# Patient Record
(demographics unavailable — no encounter records)

---

## 2025-05-28 NOTE — END OF VISIT
[id number]
[Time Spent: ___ minutes] : I have spent [unfilled] minutes of time on the encounter which excludes teaching and separately reported services.

## 2025-06-02 NOTE — FAMILY HISTORY
[TextEntry] : Family history of breast cancer in mother at 57 Family history of breast cancer in maternal cousin at 83 Family history of possible breast cancer in maternal grandmother Denies family history of ovarian cancer

## 2025-06-02 NOTE — PLAN
[TextEntry] : -genetic testing -PCP clearance -L ultrasound-guided lumpectomy and sentinel node biopsy -MRI due to extreme density of mammogram

## 2025-06-02 NOTE — PHYSICAL EXAM
[Supple] : supple [No Supraclavicular Adenopathy] : no supraclavicular adenopathy [Examined in the supine and seated position] : examined in the supine and seated position [No dominant masses] : no dominant masses in right breast  [No Nipple Retraction] : no left nipple retraction [No Nipple Discharge] : no left nipple discharge [No Axillary Lymphadenopathy] : no left axillary lymphadenopathy [de-identified] :  left breast mass at 11:00 8cmFN.  It is high up at the superior rim of the breast. It is slightly tethered but mobile.

## 2025-06-02 NOTE — PHYSICAL EXAM
[Supple] : supple [No Supraclavicular Adenopathy] : no supraclavicular adenopathy [Examined in the supine and seated position] : examined in the supine and seated position [No dominant masses] : no dominant masses in right breast  [No Nipple Retraction] : no left nipple retraction [No Nipple Discharge] : no left nipple discharge [No Axillary Lymphadenopathy] : no left axillary lymphadenopathy [de-identified] :  left breast mass at 11:00 8cmFN.  It is high up at the superior rim of the breast. It is slightly tethered but mobile.

## 2025-06-02 NOTE — ASSESSMENT
[FreeTextEntry1] : 83 year old female presents for initial evaluation regarding LEFT IDC, ER/HI+ HER-2 equivocal, FISH pending, initially palpated by the patient and seen on B/l dxMMG/US 5/15/25 as a lobulated palpable mass L 11:00 8cmfn measuring 2.7cm, BI-RADS 5, with rec for US  bx -- completed 5/20/25; path yielded well differentiated IDC measuring at least 10mm a/w calcs.  Reviewed and discussed pathology and imaging results. The use of multi-modal therapy for the treatment of breast cancer was discussed. Reviewed with patient that her tumor is ER/HI+, however pending FISH. Briefly reviewed the efficacy of anti-estrogen medication in the treatment of ER+ breast tumors. Discussed FISH/HER-2 testing and its importance in treatment planning. Advised patient if HER-2 +, she is likely to require chemotherapy. Discussed adjuvant vs neoadjuvant chemotherapy with Herceptin. Advised patient if she is HER-2 negative then she will only require anti-hormonal medication. All patients questions were answered to their satisfaction.   Surgical options were reviewed including a lumpectomy to negative margins, with whole breast radiation therapy versus a mastectomy with or without reconstruction. Patient made aware that she is amendable to lumpectomy and mastectomy is not recommended. Also reviewed with patient that the deep margin may be difficult to obtain and therefore may have to take a small amount of muscle. Discussed SLNBx procedure and advised patient that, given her age, this is not required at the time of lumpectomy. However, given the tumor size (2.7cm) and unfavorable location in conjunction with patients anxiety regarding her mothers experience with stage 4 breast cancer, decided with patient to proceed with SLNBx. Touched upon general indications for the use of adjuvant hormonal and chemotherapy and targeted therapies. It was explained that medical treatments are ultimately dependent on the final surgical pathology results. Discussed use of risk assessment assay Oncotype on surgical specimen. Briefly reviewed the possibility of radiation therapy given the location of her tumor. However, she was made aware that typically radiation is not necessary in a patient her age with ER+ tumor. Patient advised that proper referrals (med onc and rad onc) will be provided when needed.  After further review of her films, her breast tissue is extremely dense and would benefit from MRI.  Will go ahead and order it.  Discussed patient's diagnosis in detail and answered any questions. Plan for L localized lumpectomy + L SLNBx, pending PCP clearance. Surgical consent obtained today, surgical coordinator aware. Baseline sozo measurement obtained. Discussed importance and implication of genetic testing in regards to her family history and offspring. Patient would like to go forward with genetic testing -- bloodwork drawn in-office today for [facility]. All patients questions were answered to their satisfaction. Patient verbalizes understanding and agreement with the plan.

## 2025-06-02 NOTE — DATA REVIEWED
[FreeTextEntry1] : 5/15/25 ([facility]) B/l dxMMG/US: extremely dense. Lobulated palpable mass L 11:00 close to pectoral muscle. Measures 2.7cm and has findings c/w carcinoma of the left breast. BI-RADS 5. F/u: US bx   5/20/25 ([facility]) L 11:00 8cmfn mass US bx (Q clip): path yielded IDC, tumor markers pending, well differentiated, measuring at least 10mm, a/w calcs. Concordant and malignant. F/u: surgical or oncologic management

## 2025-06-02 NOTE — CONSULT LETTER
[Dear  ___] : Dear ~DREA, [Consult Letter:] : I had the pleasure of evaluating your patient, [unfilled]. [Please see my note below.] : Please see my note below. [Consult Closing:] : Thank you very much for allowing me to participate in the care of this patient.  If you have any questions, please do not hesitate to contact me. [Sincerely,] : Sincerely, [FreeTextEntry2] : Dr. Allison Stephens [FreeTextEntry3] : Nimesh Boogie MD Chief of Breast Surgery Director of Breast Cancer Program [facility]

## 2025-06-02 NOTE — CONSULT LETTER
[Dear  ___] : Dear ~DREA, [Consult Letter:] : I had the pleasure of evaluating your patient, [unfilled]. [Please see my note below.] : Please see my note below. [Consult Closing:] : Thank you very much for allowing me to participate in the care of this patient.  If you have any questions, please do not hesitate to contact me. [Sincerely,] : Sincerely, [FreeTextEntry2] : Dr. Allison Stephens [FreeTextEntry3] : Nimesh Boogie MD Chief of Breast Surgery Director of Breast Cancer Program [facility]

## 2025-06-02 NOTE — PHYSICAL EXAM
[Supple] : supple [No Supraclavicular Adenopathy] : no supraclavicular adenopathy [Examined in the supine and seated position] : examined in the supine and seated position [No dominant masses] : no dominant masses in right breast  [No Nipple Retraction] : no left nipple retraction [No Nipple Discharge] : no left nipple discharge [No Axillary Lymphadenopathy] : no left axillary lymphadenopathy [de-identified] :  left breast mass at 11:00 8cmFN.  It is high up at the superior rim of the breast. It is slightly tethered but mobile.

## 2025-06-02 NOTE — HISTORY OF PRESENT ILLNESS
[FreeTextEntry1] : 83 year old female with Ashkenazi Sikhism ancestry was self-referred who presents accompanied by her daughter-in-law for initial evaluation regarding LEFT IDC, ER/ND+, HER-2 equivocal, FISH pending, initially palpated by the patient ~2 months ago and seen on B/l dxMMG/US 5/15/25 as a lobulated palpable mass L 11:00 8cmfn measuring 2.7cm, BI-RADS 5, with rec for US  bx -- completed 5/20/25; path yielded well differentiated IDC measuring at least 10mm a/w calcs. Patient reports left breast pain but otherwise denies nipple discharge or skin changes. Denies prior breast surgeries.  Patient reports family history of breast cancer in her mother at age 57, DOD at 66 y/o (metastatic to brain) and in her MCousin at age 70s s/p lumpx (MCousin negative for BRCA mutation). Denies famhx of ovarian, pancreatic, or colon cancer. Patient has not had genetic testing performed.  Patient denies history of HTN managed by medications. She denies heart disease, DM, blood clots, sleep apnea, COPD, or issues with anesthesia. Patient denies any known drug allergies.

## 2025-06-02 NOTE — HISTORY OF PRESENT ILLNESS
[FreeTextEntry1] : 83 year old female with Ashkenazi Sikh ancestry was self-referred who presents accompanied by her daughter-in-law for initial evaluation regarding LEFT IDC, ER/IA+, HER-2 equivocal, FISH pending, initially palpated by the patient ~2 months ago and seen on B/l dxMMG/US 5/15/25 as a lobulated palpable mass L 11:00 8cmfn measuring 2.7cm, BI-RADS 5, with rec for US  bx -- completed 5/20/25; path yielded well differentiated IDC measuring at least 10mm a/w calcs. Patient reports left breast pain but otherwise denies nipple discharge or skin changes. Denies prior breast surgeries.  Patient reports family history of breast cancer in her mother at age 57, DOD at 66 y/o (metastatic to brain) and in her MCousin at age 70s s/p lumpx (MCousin negative for BRCA mutation). Denies famhx of ovarian, pancreatic, or colon cancer. Patient has not had genetic testing performed.  Patient denies history of HTN managed by medications. She denies heart disease, DM, blood clots, sleep apnea, COPD, or issues with anesthesia. Patient denies any known drug allergies.

## 2025-06-02 NOTE — PROCEDURE
[FreeTextEntry1] : left breast US [FreeTextEntry2] : newly diagnosed left breast cancer [FreeTextEntry3] : Technique: a targeted left breast ultrasound was performed with evaluation of the UOQ, retroareolar region, and axilla. US Findings: left breast mass at 11:00 8cmFN was detected, 1.3cm x 2.3cm transverse by 1.3cm x 2.6cm longitudinal in dimension.

## 2025-06-02 NOTE — PAST MEDICAL HISTORY
[Postmenopausal] : The patient is postmenopausal [Menarche Age ____] : age at menarche was [unfilled] [Menopause Age____] : age at menopause was [unfilled] [Total Preg ___] : G[unfilled] [Live Births ___] : P[unfilled]  [Age At Live Birth ___] : Age at live birth: [unfilled] [History of Hormone Replacement Treatment] : has no history of hormone replacement treatment [FreeTextEntry5] : Breast biopsy [FreeTextEntry6] : No [FreeTextEntry7] : Yes, IUD in past [FreeTextEntry8] : No

## 2025-06-02 NOTE — ASSESSMENT
[FreeTextEntry1] : 83 year old female presents for initial evaluation regarding LEFT IDC, ER/OR+ HER-2 equivocal, FISH pending, initially palpated by the patient and seen on B/l dxMMG/US 5/15/25 as a lobulated palpable mass L 11:00 8cmfn measuring 2.7cm, BI-RADS 5, with rec for US  bx -- completed 5/20/25; path yielded well differentiated IDC measuring at least 10mm a/w calcs.  Reviewed and discussed pathology and imaging results. The use of multi-modal therapy for the treatment of breast cancer was discussed. Reviewed with patient that her tumor is ER/OR+, however pending FISH. Briefly reviewed the efficacy of anti-estrogen medication in the treatment of ER+ breast tumors. Discussed FISH/HER-2 testing and its importance in treatment planning. Advised patient if HER-2 +, she is likely to require chemotherapy. Discussed adjuvant vs neoadjuvant chemotherapy with Herceptin. Advised patient if she is HER-2 negative then she will only require anti-hormonal medication. All patients questions were answered to their satisfaction.   Surgical options were reviewed including a lumpectomy to negative margins, with whole breast radiation therapy versus a mastectomy with or without reconstruction. Patient made aware that she is amendable to lumpectomy and mastectomy is not recommended. Also reviewed with patient that the deep margin may be difficult to obtain and therefore may have to take a small amount of muscle. Discussed SLNBx procedure and advised patient that, given her age, this is not required at the time of lumpectomy. However, given the tumor size (2.7cm) and unfavorable location in conjunction with patients anxiety regarding her mothers experience with stage 4 breast cancer, decided with patient to proceed with SLNBx. Touched upon general indications for the use of adjuvant hormonal and chemotherapy and targeted therapies. It was explained that medical treatments are ultimately dependent on the final surgical pathology results. Discussed use of risk assessment assay Oncotype on surgical specimen. Briefly reviewed the possibility of radiation therapy given the location of her tumor. However, she was made aware that typically radiation is not necessary in a patient her age with ER+ tumor. Patient advised that proper referrals (med onc and rad onc) will be provided when needed.  After further review of her films, her breast tissue is extremely dense and would benefit from MRI.  Will go ahead and order it.  Discussed patient's diagnosis in detail and answered any questions. Plan for L localized lumpectomy + L SLNBx, pending PCP clearance. Surgical consent obtained today, surgical coordinator aware. Baseline sozo measurement obtained. Discussed importance and implication of genetic testing in regards to her family history and offspring. Patient would like to go forward with genetic testing -- bloodwork drawn in-office today for [employer]. All patients questions were answered to their satisfaction. Patient verbalizes understanding and agreement with the plan.

## 2025-06-02 NOTE — HISTORY OF PRESENT ILLNESS
[FreeTextEntry1] : 83 year old female with Ashkenazi [place] ancestry was self-referred who presents accompanied by her daughter-in-law for initial evaluation regarding LEFT IDC, ER/OH+, HER-2 equivocal, FISH pending, initially palpated by the patient ~2 months ago and seen on B/l dxMMG/US 5/15/25 as a lobulated palpable mass L 11:00 8cmfn measuring 2.7cm, BI-RADS 5, with rec for US  bx -- completed 5/20/25; path yielded well differentiated IDC measuring at least 10mm a/w calcs. Patient reports left breast pain but otherwise denies nipple discharge or skin changes. Denies prior breast surgeries.  Patient reports family history of breast cancer in her mother at age 57, DOD at 68 y/o (metastatic to brain) and in her MCousin at age 70s s/p lumpx (MCousin negative for BRCA mutation). Denies famhx of ovarian, pancreatic, or colon cancer. Patient has not had genetic testing performed.  Patient denies history of HTN managed by medications. She denies heart disease, DM, blood clots, sleep apnea, COPD, or issues with anesthesia. Patient denies any known drug allergies.

## 2025-06-02 NOTE — ASSESSMENT
[FreeTextEntry1] : 83 year old female presents for initial evaluation regarding LEFT IDC, ER/NM+ HER-2 equivocal, FISH pending, initially palpated by the patient and seen on B/l dxMMG/US 5/15/25 as a lobulated palpable mass L 11:00 8cmfn measuring 2.7cm, BI-RADS 5, with rec for US  bx -- completed 5/20/25; path yielded well differentiated IDC measuring at least 10mm a/w calcs.  Reviewed and discussed pathology and imaging results. The use of multi-modal therapy for the treatment of breast cancer was discussed. Reviewed with patient that her tumor is ER/NM+, however pending FISH. Briefly reviewed the efficacy of anti-estrogen medication in the treatment of ER+ breast tumors. Discussed FISH/HER-2 testing and its importance in treatment planning. Advised patient if HER-2 +, she is likely to require chemotherapy. Discussed adjuvant vs neoadjuvant chemotherapy with Herceptin. Advised patient if she is HER-2 negative then she will only require anti-hormonal medication. All patients questions were answered to their satisfaction.   Surgical options were reviewed including a lumpectomy to negative margins, with whole breast radiation therapy versus a mastectomy with or without reconstruction. Patient made aware that she is amendable to lumpectomy and mastectomy is not recommended. Also reviewed with patient that the deep margin may be difficult to obtain and therefore may have to take a small amount of muscle. Discussed SLNBx procedure and advised patient that, given her age, this is not required at the time of lumpectomy. However, given the tumor size (2.7cm) and unfavorable location in conjunction with patients anxiety regarding her mothers experience with stage 4 breast cancer, decided with patient to proceed with SLNBx. Touched upon general indications for the use of adjuvant hormonal and chemotherapy and targeted therapies. It was explained that medical treatments are ultimately dependent on the final surgical pathology results. Discussed use of risk assessment assay Oncotype on surgical specimen. Briefly reviewed the possibility of radiation therapy given the location of her tumor. However, she was made aware that typically radiation is not necessary in a patient her age with ER+ tumor. Patient advised that proper referrals (med onc and rad onc) will be provided when needed.  After further review of her films, her breast tissue is extremely dense and would benefit from MRI.  Will go ahead and order it.  Discussed patient's diagnosis in detail and answered any questions. Plan for L localized lumpectomy + L SLNBx, pending PCP clearance. Surgical consent obtained today, surgical coordinator aware. Baseline sozo measurement obtained. Discussed importance and implication of genetic testing in regards to her family history and offspring. Patient would like to go forward with genetic testing -- bloodwork drawn in-office today for [facility] [employer]. All patients questions were answered to their satisfaction. Patient verbalizes understanding and agreement with the plan.

## 2025-06-02 NOTE — CONSULT LETTER
[Dear  ___] : Dear ~DREA, [Consult Letter:] : I had the pleasure of evaluating your patient, [unfilled]. [Please see my note below.] : Please see my note below. [Consult Closing:] : Thank you very much for allowing me to participate in the care of this patient.  If you have any questions, please do not hesitate to contact me. [Sincerely,] : Sincerely, [FreeTextEntry2] : Dr. Allison Stephens [FreeTextEntry3] : Nimesh Boogie MD Chief of Breast Surgery Director of Breast Cancer Program [facility]

## 2025-07-01 NOTE — HISTORY OF PRESENT ILLNESS
[FreeTextEntry1] : 83 year old patient presents for post-op evaluation s/p B/l US guided lumpectomy and L SLNBx + b/l magtrace on 6/19/25 for RIGHT DCIS and LEFT well differentiated IDC, ER/KS+, HER-2 equivocal, FISH negative, initially palpated by the patient ~2 months ago and seen on B/l dxMMG/US 5/15/25 as a lobulated palpable mass L 11:00 8cmfn measuring 2.7cm; proven via L US-guided biopsy on 5/20/25. Right DCIS was initially seen on pre-op MRI and proven via R MR-guided biopsy on 6/13/25.  Final surgical pathology yielded: 1) LEFT IDC, ER+%, KS+70%, HER2- equivocal FISH pending, moderately differentiated, measuring 24mm. DCIS, intermediate nuclear grade. All margins negative for invasive carcinoma and DCIS. All regional lymph nodes negative for tumor.  2) RIGHT DCIS, ER/KS+%, measuring 28mm, grade 2, present at lateral superior margin. ALH present at superior margin.   Denies any fever, chills, redness, pain, or discharge at the incision site.  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 She was previously referred to cancer genetics, however deferred consultation for genetic risk assessment.  pTNM Classification (AJCC 8th Edition) pT Category:   pT2 pN Category:   pN0 N Suffix: (sn)

## 2025-07-01 NOTE — ASSESSMENT
[FreeTextEntry1] : 83 year old patient presents for post-op evaluation s/p B/l US guided lumpectomy and L SLNBx on 6/19/25 for RIGHT DCIS and LEFT IDC, ER/UT+, HER-2 equivocal, FISH neg, initially palpated by the patient ~2 months ago and seen on B/l dxMMG/US 5/15/25 as a lobulated palpable mass L 11:00 8cmfn measuring 2.7cm, BI-RADS 5, with rec for US bx -- completed 5/20/25; path yielded well differentiated IDC measuring at least 10mm a/w calcs.  Final surgical pathology detailed below.   1) LEFT: IDC, ER+%, UT+70%, HER2- equivocal, FISH pending, moderately differentiated, measuring 24mm. DCIS, intermediate nuclear grade. All margins negative for invasive carcinoma and DCIS. All regional lymph nodes negative for tumor.  2) RIGHT: DCIS, ER/UT+%, measuring 28mm, grade 2, present at lateral superior margin. ALH present at superior margin.   Patient is doing well post-operatively. Suture ends were cut and steri-strips were reapplied. Patient to meet with med onc and rad onc; referrals provided. Will order oncotype on left breast specimen to assess recurrence score and benefit to chemotherapy. Results and treatment planning to be discussed with med onc. Will order DCisionRT on right breast specimen to assess for patient's recurrence risk. Patient to see radiation oncologist to discuss DCISionRT results and plan whether to proceed with radiation. 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 Patient provided with referral to genetic counselor and urged to proceed with consultation to further discuss her results and genetic risk assessment. Patient met with nurse navigator, Asad, in the office today. Patient is to return in 3 months for reexamination and repeat sozo measurement. Patient verbalizes understanding and is in agreement with the plan.

## 2025-07-01 NOTE — DATA REVIEWED
[FreeTextEntry1] : 5/15/25 ([facility]) B/l dxMMG/US: extremely dense. Lobulated palpable mass L 11:00 close to pectoral muscle. Measures 2.7cm and has findings c/w carcinoma of the left breast. BI-RADS 5. F/u: US bx   5/20/25 ([facility]) L 11:00 8cmfn mass US bx (Q clip): IDC, ER/WV+, HER-2 equivocal, FISH negative, well differentiated, measuring at least 10mm, a/w calcs. Concordant and malignant. F/u: surgical or oncologic management   6/5/25 ([facility]) B/L MRI: Nonmass-like enhancement in the right upper inner quadrant. MRI biopsy is recommended. Index carcinoma seen in the left upper inner posterior breast. F/U: R MR guided biopsy. BI-RADS 4B - Suspicious.  6/13/25 ([facility]) R 2-3:00 6cmfn MR bx (buckle clip): DCIS, papillary and cribriform types with intermediate nuclear grade. Calcs a/w DCIS and benign epithelium.   6/19/25 ([facility]) B/l US guided lumpectomy and L SLNBx:  1. Right breast lumpectomy: Ductal carcinoma in situ (DCIS), intermediate nuclear grade, cribriform, papillary and solid type. DCIS is present in 10 of 19 slides and measures 28 mm in greatest dimension (calculated measurement) Margins of the specimen: DCIS is present focally at the lateral margin and is less than 1 mm from the deep/posterior margin, see additional parts and synoptic summary for final margin status. Additional findings: atypical lobular hyperplasia, fibroadenomatoid change, dense stromal fibrosis and reactive prior biopsy site changes 2. Right breast lateral superior margin: Ductal carcinoma in situ, present 3 mm from the new margin 3. Right breast superior margin: Focal atypical lobular hyperplasia 4. Right breast medial margin: Benign breast tissue 5. Right breast inferior margin: Benign breast tissue 6. Right breast deep margin: Benign breast tissue and skeletal muscle 7. Right breast anterior margin: Benign breast tissue 8. Left breast lumpectomy: Invasive moderately differentiated ductal carcinoma, measuring 24 mm in greatest dimension (calculated measurement), see synoptic summary DCIS, intermediate nuclear grade, solid, cribriform and papillary types. DCIS is present in 1 of 21 slides. Margins of the specimen: All margins are negative for invasive carcinoma; invasive carcinoma is present less than 1 mm from the deep/posterior margin, see additional parts and synoptic summary for final margin status. All margins are negative for DCIS (>3 mm). Additional findings: benign skin, reactive prior biopsy site changes. 9. Left breast deep margin: Benign breast tissue and skeletal muscle 10. Left breast medial margin: Benign breast tissue 11. Left breast superior margin: Benign breast tissue 12. Left breast inferior margin: Benign breast tissue 13. Left breast lateral margin: Benign breast tissue 14. Left axilla sentinel node #1 (1365): One lymph node negative for carcinoma (0/1) 15. Left sentinel node (150): One lymph node negative for carcinoma (0/1) 16. Left sentinel lymph node #3: One lymph node negative for carcinoma (0/1)

## 2025-07-01 NOTE — ASSESSMENT
[FreeTextEntry1] : 83 year old patient presents for post-op evaluation s/p B/l US guided lumpectomy and L SLNBx on 6/19/25 for RIGHT DCIS and LEFT IDC, ER/MA+, HER-2 equivocal, FISH neg, initially palpated by the patient ~2 months ago and seen on B/l dxMMG/US 5/15/25 as a lobulated palpable mass L 11:00 8cmfn measuring 2.7cm, BI-RADS 5, with rec for US bx -- completed 5/20/25; path yielded well differentiated IDC measuring at least 10mm a/w calcs.  Final surgical pathology detailed below.   1) LEFT: IDC, ER+%, MA+70%, HER2- equivocal, FISH pending, moderately differentiated, measuring 24mm. DCIS, intermediate nuclear grade. All margins negative for invasive carcinoma and DCIS. All regional lymph nodes negative for tumor.  2) RIGHT: DCIS, ER/MA+%, measuring 28mm, grade 2, present at lateral superior margin. ALH present at superior margin.   Patient is doing well post-operatively. Suture ends were cut and steri-strips were reapplied. Patient to meet with med onc and rad onc; referrals provided. Will order oncotype on left breast specimen to assess recurrence score and benefit to chemotherapy. Results and treatment planning to be discussed with med onc. Will order DCisionRT on right breast specimen to assess for patient's recurrence risk. Patient to see radiation oncologist to discuss DCISionRT results and plan whether to proceed with radiation. 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 Patient provided with referral to genetic counselor and urged to proceed with consultation to further discuss her results and genetic risk assessment. Patient met with nurse navigator, Asad, in the office today. Patient is to return in 3 months for reexamination and repeat sozo measurement. Patient verbalizes understanding and is in agreement with the plan.

## 2025-07-01 NOTE — REASON FOR VISIT
[Post Op: _________] : a [unfilled] post op visit [FreeTextEntry1] : BILATERAL ULTRASOUND GUIDED LUMPECTOMY WITH LEFT SLNB [FreeTextEntry2] : 6/19/25

## 2025-07-01 NOTE — DATA REVIEWED
[FreeTextEntry1] : 5/15/25 ([facility]) B/l dxMMG/US: extremely dense. Lobulated palpable mass L 11:00 close to pectoral muscle. Measures 2.7cm and has findings c/w carcinoma of the left breast. BI-RADS 5. F/u: US bx   5/20/25 ([facility]) L 11:00 8cmfn mass US bx (Q clip): IDC, ER/CA+, HER-2 equivocal, FISH negative, well differentiated, measuring at least 10mm, a/w calcs. Concordant and malignant. F/u: surgical or oncologic management   6/5/25 ([facility]) B/L MRI: Nonmass-like enhancement in the right upper inner quadrant. MRI biopsy is recommended. Index carcinoma seen in the left upper inner posterior breast. F/U: R MR guided biopsy. BI-RADS 4B - Suspicious.  6/13/25 ([facility]) R 2-3:00 6cmfn MR bx (buckle clip): DCIS, papillary and cribriform types with intermediate nuclear grade. Calcs a/w DCIS and benign epithelium.   6/19/25 ([facility] path) B/l US guided lumpectomy and L SLNBx:  1. Right breast lumpectomy: Ductal carcinoma in situ (DCIS), intermediate nuclear grade, cribriform, papillary and solid type. DCIS is present in 10 of 19 slides and measures 28 mm in greatest dimension (calculated measurement) Margins of the specimen: DCIS is present focally at the lateral margin and is less than 1 mm from the deep/posterior margin, see additional parts and synoptic summary for final margin status. Additional findings: atypical lobular hyperplasia, fibroadenomatoid change, dense stromal fibrosis and reactive prior biopsy site changes 2. Right breast lateral superior margin: Ductal carcinoma in situ, present 3 mm from the new margin 3. Right breast superior margin: Focal atypical lobular hyperplasia 4. Right breast medial margin: Benign breast tissue 5. Right breast inferior margin: Benign breast tissue 6. Right breast deep margin: Benign breast tissue and skeletal muscle 7. Right breast anterior margin: Benign breast tissue 8. Left breast lumpectomy: Invasive moderately differentiated ductal carcinoma, measuring 24 mm in greatest dimension (calculated measurement), see synoptic summary DCIS, intermediate nuclear grade, solid, cribriform and papillary types. DCIS is present in 1 of 21 slides. Margins of the specimen: All margins are negative for invasive carcinoma; invasive carcinoma is present less than 1 mm from the deep/posterior margin, see additional parts and synoptic summary for final margin status. All margins are negative for DCIS (>3 mm). Additional findings: benign skin, reactive prior biopsy site changes. 9. Left breast deep margin: Benign breast tissue and skeletal muscle 10. Left breast medial margin: Benign breast tissue 11. Left breast superior margin: Benign breast tissue 12. Left breast inferior margin: Benign breast tissue 13. Left breast lateral margin: Benign breast tissue 14. Left axilla sentinel node #1 (1365): One lymph node negative for carcinoma (0/1) 15. Left sentinel node (150): One lymph node negative for carcinoma (0/1) 16. Left sentinel lymph node #3: One lymph node negative for carcinoma (0/1)

## 2025-07-01 NOTE — PHYSICAL EXAM
[Supple] : supple [No Supraclavicular Adenopathy] : no supraclavicular adenopathy [Examined in the supine and seated position] : examined in the supine and seated position [No dominant masses] : no dominant masses in right breast  [No Nipple Retraction] : no left nipple retraction [No Nipple Discharge] : no left nipple discharge [No Axillary Lymphadenopathy] : no left axillary lymphadenopathy [No dominant masses] : no dominant masses left breast [de-identified] : incisions c/d/i

## 2025-07-01 NOTE — PHYSICAL EXAM
[Supple] : supple [No Supraclavicular Adenopathy] : no supraclavicular adenopathy [Examined in the supine and seated position] : examined in the supine and seated position [No dominant masses] : no dominant masses in right breast  [No Nipple Retraction] : no left nipple retraction [No Nipple Discharge] : no left nipple discharge [No Axillary Lymphadenopathy] : no left axillary lymphadenopathy [No dominant masses] : no dominant masses left breast [de-identified] : incisions c/d/i

## 2025-07-01 NOTE — PLAN
[TextEntry] : -Oncotype on left invasive cancer -DCISionRT on right DCIS referral to med onc -referral to rad onc -referral to genetic counselor -RTC (+sozo) in Oct 2025

## 2025-07-01 NOTE — HISTORY OF PRESENT ILLNESS
[FreeTextEntry1] : 83 year old patient presents for post-op evaluation s/p B/l US guided lumpectomy and L SLNBx + b/l magtrace on 6/19/25 for RIGHT DCIS and LEFT well differentiated IDC, ER/HI+, HER-2 equivocal, FISH negative, initially palpated by the patient ~2 months ago and seen on B/l dxMMG/US 5/15/25 as a lobulated palpable mass L 11:00 8cmfn measuring 2.7cm; proven via L US-guided biopsy on 5/20/25. Right DCIS was initially seen on pre-op MRI and proven via R MR-guided biopsy on 6/13/25.  Final surgical pathology yielded: 1) LEFT IDC, ER+%, HI+70%, HER2- equivocal FISH pending, moderately differentiated, measuring 24mm. DCIS, intermediate nuclear grade. All margins negative for invasive carcinoma and DCIS. All regional lymph nodes negative for tumor.  2) RIGHT DCIS, ER/HI+%, measuring 28mm, grade 2, present at lateral superior margin. ALH present at superior margin.   Denies any fever, chills, redness, pain, or discharge at the incision site.  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 She was previously referred to cancer genetics, however deferred consultation for genetic risk assessment.  pTNM Classification (AJCC 8th Edition) pT Category:   pT2 pN Category:   pN0 N Suffix: (sn)

## 2025-07-08 NOTE — REVIEW OF SYSTEMS
[Negative] : Allergic/Immunologic [Edema Limbs: Grade 0] : Edema Limbs: Grade 0  [Fatigue: Grade 0] : Fatigue: Grade 0 [Localized Edema: Grade 0] : Localized Edema: Grade 0  [Breast Pain: Grade 1 - Mild pain] : Breast Pain: Grade 1 - Mild pain

## 2025-07-11 NOTE — VITALS
[Date: ____________] : Patient's last distress assessment performed on [unfilled]. [Maximal Pain Intensity: 0/10] : 0/10 [Least Pain Intensity: 0/10] : 0/10 [90: Able to carry normal activity; minor signs or symptoms of disease.] : 90: Able to carry normal activity; minor signs or symptoms of disease.  [ECOG Performance Status: 0 - Fully active, able to carry on all pre-disease performance without restriction] : Performance Status: 0 - Fully active, able to carry on all pre-disease performance without restriction [4 - Distress Level] : Distress Level: 4

## 2025-07-11 NOTE — HISTORY OF PRESENT ILLNESS
[FreeTextEntry1] : Nida Wen is a 84 yo F  with AJCC 8th Ed Stage 0 (Tis) RIGHT UIQ Breast ER+/DC+ breast DCIS and AJCC 8th Ed Stage IIA (pT2N0) LEFT UIQ Breast ER+%, DC+70%, HER2- breast IDC s/p 6/19/25 B/L lumpectomy and Left SLNBx (RIGHT: 28mm, grade II,  margins negative, LEFT: 24mm, grade II, - LVI, margins negative, 0/3 nodes positive) who is referred by Dr. Boogie for discussion of adjuvant radiation to the bilateral breasts.    7/8/25: Consultation  Ms. Wen presents for discussion of radiation. No history of prior radiation, PPM/ICD, or connective tissue disorders. She lives alone and is independent in ADLs. She reports good support system.  ROM is good and she has some residual postoperative pain.   History of Present Illness  ______________________________________  Nida Wen is a 84 yo F with pmh HTN, thyroiditis, and osteoarthritis who was found to have B/L breast cancer following a Left breast palpated mass.   5/15/25 ([facility]) B/l dxMMG/US: extremely dense. Lobulated palpable mass L 11:00 close to pectoral muscle. Measures 2.7cm and has findings c/w carcinoma of the left breast. BI-RADS 5. F/u: US bx   5/20/25 ([facility]) L 11:00 8cmfn mass US bx (Q clip): IDC, ER/DC+, HER-2 equivocal, FISH negative, well differentiated, measuring at least 10mm, a/w calcs. Concordant and malignant. F/u: surgical or oncologic management   6/5/25 ([facility]) B/L MRI: Nonmass-like enhancement in the right upper inner quadrant. MRI biopsy is recommended. Index carcinoma seen in the left upper inner posterior breast. F/U: R MR guided biopsy. BI-RADS 4B - Suspicious.   6/13/25 ([facility]) R 2-3:00 6cmfn MR bx (buckle clip): DCIS, papillary and cribriform types with intermediate nuclear grade. Calcs a/w DCIS and benign epithelium.   6/19/25 ([facility] path) B/l US guided lumpectomy and L SLNBx:  1. Right breast lumpectomy: Ductal carcinoma in situ (DCIS), intermediate nuclear grade, cribriform, papillary and solid type. DCIS is present in 10 of 19 slides and measures 28 mm in greatest  dimension (calculated measurement) Margins of the specimen: DCIS is present focally at the lateral margin and is less than 1 mm from the deep/posterior margin, see additional parts and synoptic summary for final margin status. Additional findings: atypical lobular hyperplasia, fibroadenomatoid change, dense stromal fibrosis and reactive prior biopsy site changes  2. Right breast lateral superior margin: Ductal carcinoma in situ, present 3 mm from the new margin  3. Right breast superior margin: Focal atypical lobular hyperplasia  4. Right breast medial margin: Benign breast tissue  5. Right breast inferior margin: Benign breast tissue  6. Right breast deep margin: Benign breast tissue and skeletal muscle  7. Right breast anterior margin: Benign breast tissue  8. Left breast lumpectomy: Invasive moderately differentiated ductal carcinoma, measuring 24 mm in greatest dimension (calculated measurement), see synoptic summary DCIS, intermediate nuclear grade, solid, cribriform and papillary types. DCIS is present in 1 of 21 slides. Margins of the specimen: All margins are negative for invasive carcinoma; invasive carcinoma is present less than 1 mm from the deep/posterior margin, see additional parts and synoptic summary for final margin status. All margins are negative for DCIS (>3 mm). Additional findings: benign skin, reactive prior biopsy site changes.  9. Left breast deep margin: Benign breast tissue and skeletal muscle  10. Left breast medial margin: Benign breast tissue  11. Left breast superior margin: Benign breast tissue  12. Left breast inferior margin: Benign breast tissue  13. Left breast lateral margin: Benign breast tissue  14. Left axilla sentinel node #1 (1365): One lymph node negative for carcinoma (0/1)  15. Left sentinel node (150): One lymph node negative for carcinoma (0/1)  16. Left sentinel lymph node #3: One lymph node negative for carcinoma (0/1).

## 2025-07-11 NOTE — PHYSICAL EXAM
[Supraclavicular Lymph Nodes Enlarged Bilaterally] : supraclavicular [Axillary Lymph Nodes Enlarged Bilaterally] : axillary [de-identified] : well healed bilateral breast incisions. No abnormal masses

## 2025-07-11 NOTE — HISTORY OF PRESENT ILLNESS
[FreeTextEntry1] : Nida Wen is a 84 yo F  with AJCC 8th Ed Stage 0 (Tis) RIGHT UIQ Breast ER+/AK+ breast DCIS and AJCC 8th Ed Stage IIA (pT2N0) LEFT UIQ Breast ER+%, AK+70%, HER2- breast IDC s/p 6/19/25 B/L lumpectomy and Left SLNBx (RIGHT: 28mm, grade II,  margins negative, LEFT: 24mm, grade II, - LVI, margins negative, 0/3 nodes positive) who is referred by Dr. Boogie for discussion of adjuvant radiation to the bilateral breasts.    7/8/25: Consultation  Ms. Wen presents for discussion of radiation. No history of prior radiation, PPM/ICD, or connective tissue disorders. She lives alone and is independent in ADLs. She reports good support system.  ROM is good and she has some residual postoperative pain.   History of Present Illness  ______________________________________  Nida Wen is a 84 yo F with pmh HTN, thyroiditis, and osteoarthritis who was found to have B/L breast cancer following a Left breast palpated mass.   5/15/25 ([facility]) B/l dxMMG/US: extremely dense. Lobulated palpable mass L 11:00 close to pectoral muscle. Measures 2.7cm and has findings c/w carcinoma of the left breast. BI-RADS 5. F/u: US bx   5/20/25 ([facility]) L 11:00 8cmfn mass US bx (Q clip): IDC, ER/AK+, HER-2 equivocal, FISH negative, well differentiated, measuring at least 10mm, a/w calcs. Concordant and malignant. F/u: surgical or oncologic management   6/5/25 ([facility]) B/L MRI: Nonmass-like enhancement in the right upper inner quadrant. MRI biopsy is recommended. Index carcinoma seen in the left upper inner posterior breast. F/U: R MR guided biopsy. BI-RADS 4B - Suspicious.   6/13/25 ([facility]) R 2-3:00 6cmfn MR bx (buckle clip): DCIS, papillary and cribriform types with intermediate nuclear grade. Calcs a/w DCIS and benign epithelium.   6/19/25 ([facility] path) B/l US guided lumpectomy and L SLNBx:  1. Right breast lumpectomy: Ductal carcinoma in situ (DCIS), intermediate nuclear grade, cribriform, papillary and solid type. DCIS is present in 10 of 19 slides and measures 28 mm in greatest  dimension (calculated measurement) Margins of the specimen: DCIS is present focally at the lateral margin and is less than 1 mm from the deep/posterior margin, see additional parts and synoptic summary for final margin status. Additional findings: atypical lobular hyperplasia, fibroadenomatoid change, dense stromal fibrosis and reactive prior biopsy site changes  2. Right breast lateral superior margin: Ductal carcinoma in situ, present 3 mm from the new margin  3. Right breast superior margin: Focal atypical lobular hyperplasia  4. Right breast medial margin: Benign breast tissue  5. Right breast inferior margin: Benign breast tissue  6. Right breast deep margin: Benign breast tissue and skeletal muscle  7. Right breast anterior margin: Benign breast tissue  8. Left breast lumpectomy: Invasive moderately differentiated ductal carcinoma, measuring 24 mm in greatest dimension (calculated measurement), see synoptic summary DCIS, intermediate nuclear grade, solid, cribriform and papillary types. DCIS is present in 1 of 21 slides. Margins of the specimen: All margins are negative for invasive carcinoma; invasive carcinoma is present less than 1 mm from the deep/posterior margin, see additional parts and synoptic summary for final margin status. All margins are negative for DCIS (>3 mm). Additional findings: benign skin, reactive prior biopsy site changes.  9. Left breast deep margin: Benign breast tissue and skeletal muscle  10. Left breast medial margin: Benign breast tissue  11. Left breast superior margin: Benign breast tissue  12. Left breast inferior margin: Benign breast tissue  13. Left breast lateral margin: Benign breast tissue  14. Left axilla sentinel node #1 (1365): One lymph node negative for carcinoma (0/1)  15. Left sentinel node (150): One lymph node negative for carcinoma (0/1)  16. Left sentinel lymph node #3: One lymph node negative for carcinoma (0/1).

## 2025-07-11 NOTE — PHYSICAL EXAM
[Supraclavicular Lymph Nodes Enlarged Bilaterally] : supraclavicular [Axillary Lymph Nodes Enlarged Bilaterally] : axillary [de-identified] : well healed bilateral breast incisions. No abnormal masses

## 2025-07-11 NOTE — HISTORY OF PRESENT ILLNESS
[FreeTextEntry1] : Nida Wen is a 82 yo F  with AJCC 8th Ed Stage 0 (Tis) RIGHT UIQ Breast ER+/AR+ breast DCIS and AJCC 8th Ed Stage IIA (pT2N0) LEFT UIQ Breast ER+%, AR+70%, HER2- breast IDC s/p 6/19/25 B/L lumpectomy and Left SLNBx (RIGHT: 28mm, grade II,  margins negative, LEFT: 24mm, grade II, - LVI, margins negative, 0/3 nodes positive) who is referred by Dr. Boogie for discussion of adjuvant radiation to the bilateral breasts.    7/8/25: Consultation  Ms. Wen presents for discussion of radiation. No history of prior radiation, PPM/ICD, or connective tissue disorders. She lives alone and is independent in ADLs. She reports good support system.  ROM is good and she has some residual postoperative pain.   History of Present Illness  ______________________________________  Nida Wen is a 82 yo F with pmh HTN, thyroiditis, and osteoarthritis who was found to have B/L breast cancer following a Left breast palpated mass.   5/15/25 ([facility]) B/l dxMMG/US: extremely dense. Lobulated palpable mass L 11:00 close to pectoral muscle. Measures 2.7cm and has findings c/w carcinoma of the left breast. BI-RADS 5. F/u: US bx   5/20/25 ([facility]) L 11:00 8cmfn mass US bx (Q clip): IDC, ER/AR+, HER-2 equivocal, FISH negative, well differentiated, measuring at least 10mm, a/w calcs. Concordant and malignant. F/u: surgical or oncologic management   6/5/25 ([facility]) B/L MRI: Nonmass-like enhancement in the right upper inner quadrant. MRI biopsy is recommended. Index carcinoma seen in the left upper inner posterior breast. F/U: R MR guided biopsy. BI-RADS 4B - Suspicious.   6/13/25 ([facility]) R 2-3:00 6cmfn MR bx (buckle clip): DCIS, papillary and cribriform types with intermediate nuclear grade. Calcs a/w DCIS and benign epithelium.   6/19/25 ([facility] path) B/l US guided lumpectomy and L SLNBx:  1. Right breast lumpectomy: Ductal carcinoma in situ (DCIS), intermediate nuclear grade, cribriform, papillary and solid type. DCIS is present in 10 of 19 slides and measures 28 mm in greatest  dimension (calculated measurement) Margins of the specimen: DCIS is present focally at the lateral margin and is less than 1 mm from the deep/posterior margin, see additional parts and synoptic summary for final margin status. Additional findings: atypical lobular hyperplasia, fibroadenomatoid change, dense stromal fibrosis and reactive prior biopsy site changes  2. Right breast lateral superior margin: Ductal carcinoma in situ, present 3 mm from the new margin  3. Right breast superior margin: Focal atypical lobular hyperplasia  4. Right breast medial margin: Benign breast tissue  5. Right breast inferior margin: Benign breast tissue  6. Right breast deep margin: Benign breast tissue and skeletal muscle  7. Right breast anterior margin: Benign breast tissue  8. Left breast lumpectomy: Invasive moderately differentiated ductal carcinoma, measuring 24 mm in greatest dimension (calculated measurement), see synoptic summary DCIS, intermediate nuclear grade, solid, cribriform and papillary types. DCIS is present in 1 of 21 slides. Margins of the specimen: All margins are negative for invasive carcinoma; invasive carcinoma is present less than 1 mm from the deep/posterior margin, see additional parts and synoptic summary for final margin status. All margins are negative for DCIS (>3 mm). Additional findings: benign skin, reactive prior biopsy site changes.  9. Left breast deep margin: Benign breast tissue and skeletal muscle  10. Left breast medial margin: Benign breast tissue  11. Left breast superior margin: Benign breast tissue  12. Left breast inferior margin: Benign breast tissue  13. Left breast lateral margin: Benign breast tissue  14. Left axilla sentinel node #1 (1365): One lymph node negative for carcinoma (0/1)  15. Left sentinel node (150): One lymph node negative for carcinoma (0/1)  16. Left sentinel lymph node #3: One lymph node negative for carcinoma (0/1).

## 2025-07-11 NOTE — PHYSICAL EXAM
[Supraclavicular Lymph Nodes Enlarged Bilaterally] : supraclavicular [Axillary Lymph Nodes Enlarged Bilaterally] : axillary [de-identified] : well healed bilateral breast incisions. No abnormal masses

## 2025-07-21 NOTE — DISCUSSION/SUMMARY
[FreeTextEntry1] : The visit was provided via telehealth using real-time 2-way audio visual technology. The patient, Nida Wen, was located at home in [place], [place] at the time of the visit. The Genetic Counselor, Indu Marcos, was located at the medical office located in [place], [place]. The patient and provider both participated in the telehealth encounter. Consent for telehealth services was given on 2025 by the patient, Nida Wen.   REASON FOR CONSULT  Nida Wen is an 83-year-old female who was referred by Dr. Nimesh Boogie for a discussion regarding her genetic testing results related to hereditary cancer predisposition.   RELEVANT MEDICAL HISTORY  Ms. Wen was diagnosed with left breast invasive ductal carcinoma and right breast ductal carcinoma in situ in May 2025 at age 83. She was treated with bilateral ultrasound guided lumpectomy and sentinel lymph node biopsy + bilateral magtrace on 2025. She is beginning radiation therapy.   Ms. 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 This test was ordered by Dr. Nimesh Boogie and reported on 2025.    OTHER MEDICAL AND SURGICAL HISTORY:  -	Medical history: arthritis, hypertension, thyroiditis -	Surgical history: bilateral lumpectomy, cone biopsy of cervix, knee replacement   PAST OB/GYN HISTORY:  Obstetrical History:  Age at Menarche: 12 Menopausal, age 52 Age at First Live Birth: 22 Oral Contraceptive Use: None IUD use in the past Hormone Replacement Therapy: None    CANCER SCREENING HISTORY:    Breast:  -	Diagnostic mammogram and sonogram on 5/15/2025: lobulated palpable mass in the approximate 11:00 axis of the left breast close to the pectoral muscle. Measures 2.7 cm x 1.5 cm x 2.5 cm and has findings consistent with a carcinoma of the left breast. Recommend ultrasound biopsy. -	Breast MRI on 2025: nonmass-like enhancement in the right upper inner quadrant. MRI biopsy is recommended. Index carcinoma seen in the left upper inner posterior breast.  -	Biopsy: None prior to diagnosis. -	Left breast biopsy on 2025: invasive ductal carcinoma, well-differentiated, measuring at least 10 mm associated with calcifications (ER+/[place]+/OOH3exg/FISH neg) -	Right breast biopsy on 2025; ductal carcinoma in situ, papillary and cribriform types with intermediate nuclear grade. Calcifications associated with DCIS and benign epithelium. GYN:  -	Pelvic exam: annual; most recent reported within the past year. -	Patient reported h/o cone biopsy due to abnormal cells in her 40s and h/o ovarian cyst that has been monitored. Colon:  -	Colonoscopy: most recent reported in 2024. Patient reported removal of 6 polyps at that time. No prior colon polyp history reported. -	Endoscopy: None Skin:    -	FBSE: 6-month frequency; patient reported one precancerous skin lesion removed.   SOCIAL HISTORY:  -	Tobacco-product use: None  -	Second-hand smoke exposure: mother smoked in childhood.   FAMILY HISTORY:  Maternal ancestry was reported as [place]ian and paternal ancestry was reported as Polish, Ashkenazi Mandaen. A detailed family history of cancer was ascertained, see below and scanned pedigree in chart.   Of note, Ms. Wen reported that her maternal first cousin may have undergone negative hereditary cancer germline genetic testing around the time of her breast cancer diagnosis. A copy of this report was not available for review at the time of today's session.   RESULTS INTERPRETATION AND ASSESSMENT:  We informed Ms. Wen that she tested positive for a pathogenic mutation in the FH gene. The particular mutation detected in Ms. Wen, FH [id number], was recently studied by Nain et al (2020) and their data indicated this mutation is not associated with cancer, specifically renal cell carcinoma. At this time there is no evidence Ms. Wen is at risk for autosomal dominant Hereditary Leiomyomatosis and Renal Cell Cancer (HLRCC). As per the recommendation of the National [employer] [place] HLRCC group, we do not recommend screening Ms. Wen using the HLRCC protocol published in Clinical Cancer Research Pediatric Oncology Series by Zaid et al (2017). She instead will be treated as an FH carrier at this time. However, recommendations may change as data on this FH mutation evolves, and Ms. Wen was encouraged to reach out to us for updates on this mutation or if there are any changes in her personal or family history.    We also discussed that, while no cause of the patient's personal and family history of cancer was identified, this result, while reassuring, does entirely not rule out a hereditary cancer risk in the patient. It is possible, although unlikely, the patient has a mutation in one of the genes tested that is not detectable by this analysis, or has a mutation in a different gene, either known or unknown. It is also possible there is a hereditary cancer predisposition in the family, but the patient did not inherit it.    IMPLICATIONS FOR THE PATIENT:  Given Ms. Wen's personal and current reported family history of cancer, and her FH carrier status genetic test results, the following screening guidelines and risk-reducing recommendations were discussed:    BREAST:  - Long-term management and surveillance should be based on Ms. Wen's on- or post-treatment protocol as recommended by her oncology team.    RENAL:  - No additional screening recommendations given her FH carrier status at this time.  OTHER:   - In the absence of other indications, Ms. Wen should practice age-appropriate cancer screening of other organ systems as recommended for the general population.    IMPLICATIONS FOR FAMILY MEMBERS:  The FH mutation is inherited in an autosomal dominant pattern. We discussed that the patient's first-degree relatives, such her children as well as her nieces, could pursue genetic counseling and genetic testing for reproductive purposes as there is a 50% chance they also have the same mutation, as could other relatives of reproductive age who may be family planning.    If any of her relatives were to share the FH mutation, similar to Ms. Wen, no changes would be recommended in their cancer screening or risk-reduction approach at this time. If any at-risk relatives wanted to pursue reproductive-related genetic testing any time in the future for FH, the reproductive genetic counselors and [facility] would be happy to see them and coordinate testing. If they are not local, they can locate a genetic counselor using the National Society of Genetic Counselors, Find a Genetic Counselor Tool (www.nsgc.org/findageneticcounselor).   REPRODUCTIVE IMPLICATIONS AND OPTIONS The risk of passing on the FH mutation to a future generation is 50% for each mutation. Of note, this FH mutation has been observed with other pathogenic FH mutations in individuals with autosomal recessive Fumarate Hydratase Deficiency (FHD). FHD results in severe  and early infantile encephalopathy that is characterized by poor feeding, failure to thrive, hypotonia, lethargy, and seizures. Affected individuals often have microcephaly, distinctive facial features, structural brain abnormalities and developmental delay. For those of reproductive age, we recommend the partner of an individual who is a FH carrier also have FH genetic testing to assess the risk of having a child affected with this condition if it would inform reproductive decision making. If both individuals are carriers of FH mutations, there is a 25% chance for each pregnancy to be affected with FHD. Since the genetic mutation is known, pre-implantation genetic diagnosis (PGD) is possible.    PLAN:  1. See above note for recommended management.  2. We encouraged sharing these results with family members. They have a risk to have inherited the same mutation. Other family may benefit from genetic testing and should contact a certified genetic counselor specializing in cancer. Due to HIPAA and [place] laws, Genetics is unable to directly contact other family at risk, but we are available should family members wish to reach out to us. 3. Family support resources and referrals were provided.   4. Patient informed consult note(s) will be available through their [facility] patient portal. 5.Genetic knowledge changes rapidly. We encouraged re-contacting Cancer Genetics every 2-3 years for any changes in screening recommendations or sooner if there are significant changes in personal or family history.   For any additional questions please call Cancer Genetics at [phone].       Indu Marcos MS, [facility] [place]  Genetic Counselor, Cancer Genetics     CC: Nimesh Boogie MD

## 2025-07-23 NOTE — PHYSICAL EXAM
[Supple] : supple [No Supraclavicular Adenopathy] : no supraclavicular adenopathy [Examined in the supine and seated position] : examined in the supine and seated position [No dominant masses] : no dominant masses in right breast  [No dominant masses] : no dominant masses left breast [No Nipple Retraction] : no left nipple retraction [No Nipple Discharge] : no left nipple discharge [No Axillary Lymphadenopathy] : no left axillary lymphadenopathy [de-identified] : mild ecchymosis and swelling consistent with post op hematoma

## 2025-07-23 NOTE — DATA REVIEWED
[FreeTextEntry1] : 5/15/25 ([facility]) B/l dxMMG/US: extremely dense. Lobulated palpable mass L 11:00 close to pectoral muscle. Measures 2.7cm and has findings c/w carcinoma of the left breast. BI-RADS 5. F/u: US bx   5/20/25 ([facility]) L 11:00 8cmfn mass US bx (Q clip): IDC, ER/LA+, HER-2 equivocal, FISH negative, well differentiated, measuring at least 10mm, a/w calcs. Concordant and malignant. F/u: surgical or oncologic management   6/5/25 ([facility]) B/L MRI: Nonmass-like enhancement in the right upper inner quadrant. MRI biopsy is recommended. Index carcinoma seen in the left upper inner posterior breast. F/U: R MR guided biopsy. BI-RADS 4B - Suspicious.  6/13/25 ([facility]) R 2-3:00 6cmfn MR bx (buckle clip): DCIS, papillary and cribriform types with intermediate nuclear grade. Calcs a/w DCIS and benign epithelium.   6/19/25 ([facility]) B/l US guided lumpectomy and L SLNBx:  1. Right breast lumpectomy: Ductal carcinoma in situ (DCIS), intermediate nuclear grade, cribriform, papillary and solid type. DCIS is present in 10 of 19 slides and measures 28 mm in greatest dimension (calculated measurement) Margins of the specimen: DCIS is present focally at the lateral margin and is less than 1 mm from the deep/posterior margin, see additional parts and synoptic summary for final margin status. Additional findings: atypical lobular hyperplasia, fibroadenomatoid change, dense stromal fibrosis and reactive prior biopsy site changes 2. Right breast lateral superior margin: Ductal carcinoma in situ, present 3 mm from the new margin 3. Right breast superior margin: Focal atypical lobular hyperplasia 4. Right breast medial margin: Benign breast tissue 5. Right breast inferior margin: Benign breast tissue 6. Right breast deep margin: Benign breast tissue and skeletal muscle 7. Right breast anterior margin: Benign breast tissue 8. Left breast lumpectomy: Invasive moderately differentiated ductal carcinoma, measuring 24 mm in greatest dimension (calculated measurement), see synoptic summary DCIS, intermediate nuclear grade, solid, cribriform and papillary types. DCIS is present in 1 of 21 slides. Margins of the specimen: All margins are negative for invasive carcinoma; invasive carcinoma is present less than 1 mm from the deep/posterior margin, see additional parts and synoptic summary for final margin status. All margins are negative for DCIS (>3 mm). Additional findings: benign skin, reactive prior biopsy site changes. 9. Left breast deep margin: Benign breast tissue and skeletal muscle 10. Left breast medial margin: Benign breast tissue 11. Left breast superior margin: Benign breast tissue 12. Left breast inferior margin: Benign breast tissue 13. Left breast lateral margin: Benign breast tissue 14. Left axilla sentinel node #1 (1365): One lymph node negative for carcinoma (0/1) 15. Left sentinel node (150): One lymph node negative for carcinoma (0/1) 16. Left sentinel lymph node #3: One lymph node negative for carcinoma (0/1)

## 2025-07-23 NOTE — HISTORY OF PRESENT ILLNESS
[FreeTextEntry1] : 83 year old patient presents for post-op evaluation s/p B/l US guided lumpectomy and L SLNBx + b/l magtrace on 6/19/25 for RIGHT DCIS and LEFT well differentiated IDC, ER/MI+, HER-2 equivocal, FISH negative, initially palpated by the patient ~2 months ago and seen on B/l dxMMG/US 5/15/25 as a lobulated palpable mass L 11:00 8cmfn measuring 2.7cm; proven via L US-guided biopsy on 5/20/25. Right DCIS was initially seen on pre-op MRI and proven via R MR-guided biopsy on 6/13/25.  Final surgical pathology yielded: 1) LEFT IDC, ER+%, MI+70%, HER2- equivocal FISH pending, moderately differentiated, measuring 24mm. DCIS, intermediate nuclear grade. All margins negative for invasive carcinoma and DCIS. All regional lymph nodes negative for tumor.  2) RIGHT DCIS, ER/MI+%, measuring 28mm, grade 2, present at lateral superior margin. ALH present at superior margin.   Patient here today to evaluate swelling and yellowish discoloration concerning for a hematoma.  Patient was evaluated by Dr. Byrne 7/8/25; reviewed that given age, omission of XRT is possible however patient prefers to maximally reduce risk of recurrence -- plan for XRT to both breasts.  DCISionRT score 6.8. Oncotype RS 2.  [facility] genetic testing (5/28/25) revealed she is a carrier for the FH heterozygous mutation -- patient met with genetic counselor 7/21/25.   pTNM Classification (AJCC 8th Edition) pT Category:   pT2 pN Category:   pN0 N Suffix: (sn)

## 2025-07-23 NOTE — PHYSICAL EXAM
[Supple] : supple [No Supraclavicular Adenopathy] : no supraclavicular adenopathy [Examined in the supine and seated position] : examined in the supine and seated position [No dominant masses] : no dominant masses in right breast  [No dominant masses] : no dominant masses left breast [No Nipple Retraction] : no left nipple retraction [No Nipple Discharge] : no left nipple discharge [No Axillary Lymphadenopathy] : no left axillary lymphadenopathy [de-identified] : mild ecchymosis and swelling consistent with post op hematoma

## 2025-07-23 NOTE — ASSESSMENT
[FreeTextEntry1] : 83 year old patient presents for post-op evaluation s/p B/l US guided lumpectomy and L SLNBx on 6/19/25 for RIGHT DCIS and LEFT IDC, ER/UT+, HER-2 equivocal, FISH neg, initially palpated by the patient ~2 months ago and seen on B/l dxMMG/US 5/15/25 as a lobulated palpable mass L 11:00 8cmfn measuring 2.7cm, BI-RADS 5, with rec for US bx -- completed 5/20/25; path yielded well differentiated IDC measuring at least 10mm a/w calcs.  Final surgical pathology detailed below.   1) LEFT: IDC, ER+%, UT+70%, HER2- equivocal, FISH pending, moderately differentiated, measuring 24mm. DCIS, intermediate nuclear grade. All margins negative for invasive carcinoma and DCIS. All regional lymph nodes negative for tumor.  2) RIGHT: DCIS, ER/UT+%, measuring 28mm, grade 2, present at lateral superior margin. ALH present at superior margin.   Patient is doing fair post-operatively.  Physical exam with small post op hematoma. US FNA performed in office today at bedside, a total of 23cc bloody fluid was aspirated; patient tolerated the procedure well. Aspiration site covered with sterile gauze dressing. Patient advised to use warm compresses to the area for about 15 mins at a time, 4-5 x per day. Patient advised to f/u with Dr. Ascencio. Patient is to return in 2 weeks for reexamination. Patient verbalizes understanding and is in agreement with the plan.

## 2025-07-23 NOTE — DATA REVIEWED
[FreeTextEntry1] : 5/15/25 ([facility]a) B/l dxMMG/US: extremely dense. Lobulated palpable mass L 11:00 close to pectoral muscle. Measures 2.7cm and has findings c/w carcinoma of the left breast. BI-RADS 5. F/u: US bx   5/20/25 ([facility]) L 11:00 8cmfn mass US bx (Q clip): IDC, ER/MO+, HER-2 equivocal, FISH negative, well differentiated, measuring at least 10mm, a/w calcs. Concordant and malignant. F/u: surgical or oncologic management   6/5/25 ([facility]lsa) B/L MRI: Nonmass-like enhancement in the right upper inner quadrant. MRI biopsy is recommended. Index carcinoma seen in the left upper inner posterior breast. F/U: R MR guided biopsy. BI-RADS 4B - Suspicious.  6/13/25 ([facility]a) R 2-3:00 6cmfn MR bx (buckle clip): DCIS, papillary and cribriform types with intermediate nuclear grade. Calcs a/w DCIS and benign epithelium.   6/19/25 ([facility]) B/l US guided lumpectomy and L SLNBx:  1. Right breast lumpectomy: Ductal carcinoma in situ (DCIS), intermediate nuclear grade, cribriform, papillary and solid type. DCIS is present in 10 of 19 slides and measures 28 mm in greatest dimension (calculated measurement) Margins of the specimen: DCIS is present focally at the lateral margin and is less than 1 mm from the deep/posterior margin, see additional parts and synoptic summary for final margin status. Additional findings: atypical lobular hyperplasia, fibroadenomatoid change, dense stromal fibrosis and reactive prior biopsy site changes 2. Right breast lateral superior margin: Ductal carcinoma in situ, present 3 mm from the new margin 3. Right breast superior margin: Focal atypical lobular hyperplasia 4. Right breast medial margin: Benign breast tissue 5. Right breast inferior margin: Benign breast tissue 6. Right breast deep margin: Benign breast tissue and skeletal muscle 7. Right breast anterior margin: Benign breast tissue 8. Left breast lumpectomy: Invasive moderately differentiated ductal carcinoma, measuring 24 mm in greatest dimension (calculated measurement), see synoptic summary DCIS, intermediate nuclear grade, solid, cribriform and papillary types. DCIS is present in 1 of 21 slides. Margins of the specimen: All margins are negative for invasive carcinoma; invasive carcinoma is present less than 1 mm from the deep/posterior margin, see additional parts and synoptic summary for final margin status. All margins are negative for DCIS (>3 mm). Additional findings: benign skin, reactive prior biopsy site changes. 9. Left breast deep margin: Benign breast tissue and skeletal muscle 10. Left breast medial margin: Benign breast tissue 11. Left breast superior margin: Benign breast tissue 12. Left breast inferior margin: Benign breast tissue 13. Left breast lateral margin: Benign breast tissue 14. Left axilla sentinel node #1 (1365): One lymph node negative for carcinoma (0/1) 15. Left sentinel node (150): One lymph node negative for carcinoma (0/1) 16. Left sentinel lymph node #3: One lymph node negative for carcinoma (0/1)

## 2025-07-23 NOTE — HISTORY OF PRESENT ILLNESS
[FreeTextEntry1] : 83 year old patient presents for post-op evaluation s/p B/l US guided lumpectomy and L SLNBx + b/l magtrace on 6/19/25 for RIGHT DCIS and LEFT well differentiated IDC, ER/MO+, HER-2 equivocal, FISH negative, initially palpated by the patient ~2 months ago and seen on B/l dxMMG/US 5/15/25 as a lobulated palpable mass L 11:00 8cmfn measuring 2.7cm; proven via L US-guided biopsy on 5/20/25. Right DCIS was initially seen on pre-op MRI and proven via R MR-guided biopsy on 6/13/25.  Final surgical pathology yielded: 1) LEFT IDC, ER+%, [place]+70%, HER2- equivocal FISH pending, moderately differentiated, measuring 24mm. DCIS, intermediate nuclear grade. All margins negative for invasive carcinoma and DCIS. All regional lymph nodes negative for tumor.  2) RIGHT DCIS, ER/[place]+%, measuring 28mm, grade 2, present at lateral superior margin. ALH present at superior margin.   Patient here today to evaluate swelling and yellowish discoloration concerning for a hematoma.  Patient was evaluated by Dr. Byrne 7/8/25; reviewed that given age, omission of XRT is possible however patient prefers to maximally reduce risk of recurrence -- plan for XRT to both breasts.  DCISionRT score 6.8. Oncotype RS 2.  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   pTNM Classification (AJCC 8th Edition) pT Category:   pT2 pN Category:   pN0 N Suffix: (sn)

## 2025-07-23 NOTE — ASSESSMENT
[FreeTextEntry1] : 83 year old patient presents for post-op evaluation s/p B/l US guided lumpectomy and L SLNBx on 6/19/25 for RIGHT DCIS and LEFT IDC, ER/OK+, HER-2 equivocal, FISH neg, initially palpated by the patient ~2 months ago and seen on B/l dxMMG/US 5/15/25 as a lobulated palpable mass L 11:00 8cmfn measuring 2.7cm, BI-RADS 5, with rec for US bx -- completed 5/20/25; path yielded well differentiated IDC measuring at least 10mm a/w calcs.  Final surgical pathology detailed below.   1) LEFT: IDC, ER+%, OK+70%, HER2- equivocal, FISH pending, moderately differentiated, measuring 24mm. DCIS, intermediate nuclear grade. All margins negative for invasive carcinoma and DCIS. All regional lymph nodes negative for tumor.  2) RIGHT: DCIS, ER/[place]+%, measuring 28mm, grade 2, present at lateral superior margin. ALH present at superior margin.   Patient is doing fair post-operatively.  Physical exam with small post op hematoma. US FNA performed in office today at bedside, a total of 23cc bloody fluid was aspirated; patient tolerated the procedure well. Aspiration site covered with sterile gauze dressing. Patient advised to use warm compresses to the area for about 15 mins at a time, 4-5 x per day. Patient advised to f/u with Dr. Ascencio. Patient is to return in 2 weeks for reexamination. Patient verbalizes understanding and is in agreement with the plan.

## 2025-07-23 NOTE — PROCEDURE
[FreeTextEntry1] : R breast US [FreeTextEntry2] : R breast post op hematoma  [FreeTextEntry3] : Technique: a targeted R breast ultrasound was performed with evaluation of the upper inner quadrant, retroareolar region, and axilla. US Findings: R breast hematoma at 3:00 2cmFN was detected 2.6cm x 4.7cm longitudinal in dimension No suspicious solid or complex cystic masses were detected  Technique: patient was prepped and draped, cleansed with alcohol, ultrasound gel applied, intended site of aspiration was located, using a 25G needle, a total of 3cc of 1% lidocaine was injected for local anesthetic, using ultrasound guidance, an 18G needle was introduced into site of hematoma with a total of 23of bloody fluid aspirated. The patient tolerated the procedure well, no complications.

## 2025-07-28 NOTE — HISTORY OF PRESENT ILLNESS
[de-identified] : 83 year old female with Stage IA [id number] LEFT IDC (ER+, WI+, HER2 2+ FISH negative, Oncotype RS 2) and RIGHT DCIS (ER/WI %) s/p 6/19/25 b/l lumpectomy and L SLNB, b/l magtrace is referred by Dr. Boogie to discuss adjuvant systemic therapy. Dr. yBrne is planning adjuvant RT to b/l breasts.  5/15/25 ([facility]) B/l dxMMG/US: extremely dense. Lobulated palpable mass L 11:00 close to pectoral muscle. Measures 2.7cm and has findings c/w carcinoma of the left breast. BI-RADS 5. F/u: US bx  5/20/25 ([facility]) L 11:00 8cmfn mass US bx (Q clip): IDC, ER >95%, WI 70%, HER-2 2+, FISH negative, well differentiated, measuring at least 10mm, a/w calcs. Concordant and malignant.  6/5/25 ([facility]) B/L MRI: Nonmass-like enhancement in the right upper inner quadrant. MRI biopsy is recommended. Index carcinoma seen in the left upper inner posterior breast. F/U: R MR guided biopsy. BI-RADS 4B - Suspicious.  6/13/25 ([facility]) R 2-3:00 6cmfn MR bx (buckle clip): DCIS, papillary and cribriform types with intermediate nuclear grade. Calcs a/w DCIS and benign epithelium. ER>99%, WI>99%  6/19/25 ([facility]) B/l US guided lumpectomy and L SLNBx: 1. Right breast lumpectomy: Ductal carcinoma in situ (DCIS), intermediate nuclear grade, cribriform, papillary and solid type. DCIS is present in 10 of 19 slides and measures 28 mm in greatest dimension (calculated measurement) Margins of the specimen: DCIS is present focally at the lateral margin and is less than 1 mm from the deep/posterior margin, see additional parts and synoptic summary for final margin status. Additional findings: atypical lobular hyperplasia, fibroadenomatoid change, dense stromal fibrosis and reactive prior biopsy site changes 2. Right breast lateral superior margin: Ductal carcinoma in situ, present 3 mm from the new margin 3. Right breast superior margin: Focal atypical lobular hyperplasia 4. Right breast medial margin: Benign breast tissue 5. Right breast inferior margin: Benign breast tissue 6. Right breast deep margin: Benign breast tissue and skeletal muscle 7. Right breast anterior margin: Benign breast tissue 8. Left breast lumpectomy: Invasive moderately differentiated ductal carcinoma, measuring 24 mm in greatest dimension (calculated measurement), see synoptic summary DCIS, intermediate nuclear grade, solid, cribriform and papillary types. DCIS is present in 1 of 21 slides. Margins of the specimen: All margins are negative for invasive carcinoma; invasive carcinoma is present less than 1 mm from the deep/posterior margin, see additional parts and synoptic summary for final margin status. All margins are negative for DCIS (>3 mm). Additional findings: benign skin, reactive prior biopsy site changes. 9. Left breast deep margin: Benign breast tissue and skeletal muscle 10. Left breast medial margin: Benign breast tissue 11. Left breast superior margin: Benign breast tissue 12. Left breast inferior margin: Benign breast tissue 13. Left breast lateral margin: Benign breast tissue 14. Left axilla sentinel node #1 (9045): One lymph node negative for carcinoma (0/1) 15. Left sentinel node (150): One lymph node negative for carcinoma (0/1) 16. Left sentinel lymph node #3: One lymph node negative for carcinoma (0/1).   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

## 2025-07-28 NOTE — HISTORY OF PRESENT ILLNESS
[de-identified] : 83 year old female with Stage IA [id number] LEFT IDC (ER+, CO+, HER2 2+ FISH negative, Oncotype RS 2) and RIGHT DCIS (ER/CO %) s/p 6/19/25 b/l lumpectomy and L SLNB, b/l magtrace is referred by Dr. Boogie to discuss adjuvant systemic therapy. Dr. Byrne is planning adjuvant RT to b/l breasts.  5/15/25 ([facility]) B/l dxMMG/US: extremely dense. Lobulated palpable mass L 11:00 close to pectoral muscle. Measures 2.7cm and has findings c/w carcinoma of the left breast. BI-RADS 5. F/u: US bx  5/20/25 ([facility]) L 11:00 8cmfn mass US bx (Q clip): IDC, ER >95%, CO 70%, HER-2 2+, FISH negative, well differentiated, measuring at least 10mm, a/w calcs. Concordant and malignant.  6/5/25 ([facility]) B/L MRI: Nonmass-like enhancement in the right upper inner quadrant. MRI biopsy is recommended. Index carcinoma seen in the left upper inner posterior breast. F/U: R MR guided biopsy. BI-RADS 4B - Suspicious.  6/13/25 ([facility]) R 2-3:00 6cmfn MR bx (buckle clip): DCIS, papillary and cribriform types with intermediate nuclear grade. Calcs a/w DCIS and benign epithelium. ER>99%, CO>99%  6/19/25 ([facility]) B/l US guided lumpectomy and L SLNBx: 1. Right breast lumpectomy: Ductal carcinoma in situ (DCIS), intermediate nuclear grade, cribriform, papillary and solid type. DCIS is present in 10 of 19 slides and measures 28 mm in greatest dimension (calculated measurement) Margins of the specimen: DCIS is present focally at the lateral margin and is less than 1 mm from the deep/posterior margin, see additional parts and synoptic summary for final margin status. Additional findings: atypical lobular hyperplasia, fibroadenomatoid change, dense stromal fibrosis and reactive prior biopsy site changes 2. Right breast lateral superior margin: Ductal carcinoma in situ, present 3 mm from the new margin 3. Right breast superior margin: Focal atypical lobular hyperplasia 4. Right breast medial margin: Benign breast tissue 5. Right breast inferior margin: Benign breast tissue 6. Right breast deep margin: Benign breast tissue and skeletal muscle 7. Right breast anterior margin: Benign breast tissue 8. Left breast lumpectomy: Invasive moderately differentiated ductal carcinoma, measuring 24 mm in greatest dimension (calculated measurement), see synoptic summary DCIS, intermediate nuclear grade, solid, cribriform and papillary types. DCIS is present in 1 of 21 slides. Margins of the specimen: All margins are negative for invasive carcinoma; invasive carcinoma is present less than 1 mm from the deep/posterior margin, see additional parts and synoptic summary for final margin status. All margins are negative for DCIS (>3 mm). Additional findings: benign skin, reactive prior biopsy site changes. 9. Left breast deep margin: Benign breast tissue and skeletal muscle 10. Left breast medial margin: Benign breast tissue 11. Left breast superior margin: Benign breast tissue 12. Left breast inferior margin: Benign breast tissue 13. Left breast lateral margin: Benign breast tissue 14. Left axilla sentinel node #1 (6445): One lymph node negative for carcinoma (0/1) 15. Left sentinel node (150): One lymph node negative for carcinoma (0/1) 16. Left sentinel lymph node #3: One lymph node negative for carcinoma (0/1).   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